# Patient Record
Sex: FEMALE | Race: WHITE | Employment: FULL TIME
[De-identification: names, ages, dates, MRNs, and addresses within clinical notes are randomized per-mention and may not be internally consistent; named-entity substitution may affect disease eponyms.]

---

## 2021-01-21 PROBLEM — F98.8 ATTENTION DEFICIT DISORDER: Status: ACTIVE | Noted: 2021-01-21

## 2021-01-23 PROBLEM — I73.00 RAYNAUD'S PHENOMENON WITHOUT GANGRENE: Status: ACTIVE | Noted: 2021-01-23

## 2021-04-21 PROBLEM — G57.51 TARSAL TUNNEL SYNDROME OF RIGHT SIDE: Status: ACTIVE | Noted: 2021-04-21

## 2021-12-01 ENCOUNTER — NURSE TRIAGE (OUTPATIENT)
Dept: OTHER | Facility: CLINIC | Age: 41
End: 2021-12-01

## 2021-12-01 NOTE — TELEPHONE ENCOUNTER
Reason for Disposition   Fever > 100.4 F (38.0 C)    Answer Assessment - Initial Assessment Questions  1. SEVERITY: \"How bad is the pain? \"  (e.g., Scale 1-10; mild, moderate, or severe)    - MILD (1-3): complains slightly about urination hurting    - MODERATE (4-7): interferes with normal activities      - SEVERE (8-10): excruciating, unwilling or unable to urinate because of the pain       It burns at the end of urination , 3/10    2. FREQUENCY: \"How many times have you had painful urination today? \"       Maybe 8 times since waking up     3. PATTERN: \"Is pain present every time you urinate or just sometimes? \"       Every time    4. ONSET: \"When did the painful urination start? \"       Symptoms first began last Tuesday with frequency / dribbling , was same on Wednesday, Thursday she felt better, but symptoms returned Friday so she drank cranberry juice which helped. Then 2 nights ago she got fever/chills. Yesterday she felt well enough to go to work, but last night again with fever and fever is current now. 5. FEVER: \"Do you have a fever? \" If so, ask: \"What is your temperature, how was it measured, and when did it start? \"      103.6 this AM, pt taking advil or ibuprofen. 6. PAST UTI: \"Have you had a urine infection before? \" If so, ask: \"When was the last time? \" and \"What happened that time? \"       Yes, 3 years ago    7. CAUSE: \"What do you think is causing the painful urination? \"  (e.g., UTI, scratch, Herpes sore)      Urinary or bladder infection? 8. OTHER SYMPTOMS: \"Do you have any other symptoms? \" (e.g., flank pain, vaginal discharge, genital sores, urgency, blood in urine)      Urgency - small amounts , smells \"weird\" , phlegm in throat, HA ( she was covid tested this AM - was negative)    9. PREGNANCY: \"Is there any chance you are pregnant? \" \"When was your last menstrual period? \"      no    Protocols used: URINATION PAIN - FEMALE-ADULT-[place]    Brief description of triage: see above    Triage indicates for patient to be seen now, pt verbalizes understanding. Care advice provided, patient verbalizes understanding; denies any other questions or concerns; instructed to call back for any new or worsening symptoms. Pt soft transferred to [facility] customer care, as pt is inquiring about which route is the \"cheapest\" for her care. Customer care also provided to pt. This triage is a result of a call to [address] [place] [address]. Please do not respond to the triage nurse through this encounter. Any subsequent communication should be directly with the patient.

## 2021-12-01 NOTE — TELEPHONE ENCOUNTER
Left message to call for provider advice/questions listed in this encounter. Please release information as written in this encounter to the patient or representative when they return the call.

## 2021-12-01 NOTE — TELEPHONE ENCOUNTER
Patient needs to be seen in office. Please schedule at 12:50 today with me if patient able - otherwise would likely need POD Scheduled.